# Patient Record
Sex: FEMALE | Race: WHITE | URBAN - METROPOLITAN AREA
[De-identification: names, ages, dates, MRNs, and addresses within clinical notes are randomized per-mention and may not be internally consistent; named-entity substitution may affect disease eponyms.]

---

## 2017-01-17 ENCOUNTER — TELEPHONE (OUTPATIENT)
Dept: FAMILY MEDICINE | Facility: CLINIC | Age: 22
End: 2017-01-17

## 2017-01-17 DIAGNOSIS — R51.9 CHRONIC NONINTRACTABLE HEADACHE, UNSPECIFIED HEADACHE TYPE: Primary | ICD-10-CM

## 2017-01-17 DIAGNOSIS — G89.29 CHRONIC NONINTRACTABLE HEADACHE, UNSPECIFIED HEADACHE TYPE: Primary | ICD-10-CM

## 2017-01-17 RX ORDER — AMITRIPTYLINE HYDROCHLORIDE 50 MG/1
50 TABLET ORAL AT BEDTIME
Qty: 30 TABLET | Refills: 1 | Status: SHIPPED | OUTPATIENT
Start: 2017-01-17 | End: 2017-03-20

## 2017-01-17 NOTE — TELEPHONE ENCOUNTER
Routing refill request to provider for review/approval because:  Drug interaction warning :: Fluoextine      Medina Varela RN  [facility]

## 2017-01-17 NOTE — TELEPHONE ENCOUNTER
amitriptyline (ELAVIL) 50 MG tablet      Last Written Prescription Date: 11/16/16  Last Quantity: 30, # refills: 1  Last Office Visit with [facility], [facility] or [facility] prescribing provider: 10/31/16       CREATININE   Date Value Ref Range Status   06/18/2014 0.62 0.50 - 1.00 mg/dL Final     AST       20   6/18/2014  ALT       21   6/18/2014  BP Readings from Last 3 Encounters:   10/31/16 110/70   09/27/16 111/75   05/22/15 127/80

## 2017-01-18 NOTE — TELEPHONE ENCOUNTER
Called and spoke with patient/mother, advised of NTBS.  Patient has an appt on 1/23/17.  Flora and patient would like to make this an on-going Rx and if patient needs to come in every month to ensure this, she will.    Routed to PCP for FYI and a heads-up for Monday's appt.    Medina Varela RN  [facility]

## 2017-03-20 ENCOUNTER — OFFICE VISIT (OUTPATIENT)
Dept: FAMILY MEDICINE | Facility: CLINIC | Age: 22
End: 2017-03-20
Payer: COMMERCIAL

## 2017-03-20 VITALS
HEART RATE: 100 BPM | TEMPERATURE: 97.1 F | BODY MASS INDEX: 35.51 KG/M2 | HEIGHT: 64 IN | DIASTOLIC BLOOD PRESSURE: 68 MMHG | WEIGHT: 208 LBS | SYSTOLIC BLOOD PRESSURE: 99 MMHG | OXYGEN SATURATION: 99 %

## 2017-03-20 DIAGNOSIS — R51.9 CHRONIC NONINTRACTABLE HEADACHE, UNSPECIFIED HEADACHE TYPE: ICD-10-CM

## 2017-03-20 DIAGNOSIS — G89.29 CHRONIC NONINTRACTABLE HEADACHE, UNSPECIFIED HEADACHE TYPE: ICD-10-CM

## 2017-03-20 DIAGNOSIS — Z23 NEED FOR HPV VACCINE: Primary | ICD-10-CM

## 2017-03-20 PROCEDURE — 99213 OFFICE O/P EST LOW 20 MIN: CPT | Performed by: FAMILY MEDICINE

## 2017-03-20 PROCEDURE — 90651 9VHPV VACCINE 2/3 DOSE IM: CPT | Performed by: FAMILY MEDICINE

## 2017-03-20 RX ORDER — AMITRIPTYLINE HYDROCHLORIDE 50 MG/1
50 TABLET ORAL AT BEDTIME
Qty: 90 TABLET | Refills: 1 | Status: SHIPPED | OUTPATIENT
Start: 2017-03-20

## 2017-03-20 ASSESSMENT — ANXIETY QUESTIONNAIRES
3. WORRYING TOO MUCH ABOUT DIFFERENT THINGS: MORE THAN HALF THE DAYS
5. BEING SO RESTLESS THAT IT IS HARD TO SIT STILL: MORE THAN HALF THE DAYS
6. BECOMING EASILY ANNOYED OR IRRITABLE: MORE THAN HALF THE DAYS
7. FEELING AFRAID AS IF SOMETHING AWFUL MIGHT HAPPEN: NOT AT ALL
1. FEELING NERVOUS, ANXIOUS, OR ON EDGE: MORE THAN HALF THE DAYS
2. NOT BEING ABLE TO STOP OR CONTROL WORRYING: MORE THAN HALF THE DAYS
IF YOU CHECKED OFF ANY PROBLEMS ON THIS QUESTIONNAIRE, HOW DIFFICULT HAVE THESE PROBLEMS MADE IT FOR YOU TO DO YOUR WORK, TAKE CARE OF THINGS AT HOME, OR GET ALONG WITH OTHER PEOPLE: SOMEWHAT DIFFICULT
GAD7 TOTAL SCORE: 11

## 2017-03-20 ASSESSMENT — PAIN SCALES - GENERAL: PAINLEVEL: NO PAIN (0)

## 2017-03-20 ASSESSMENT — PATIENT HEALTH QUESTIONNAIRE - PHQ9: 5. POOR APPETITE OR OVEREATING: SEVERAL DAYS

## 2017-03-20 NOTE — MR AVS SNAPSHOT
"              After Visit Summary   3/20/2017    Kiera Juarez    MRN: [id number]           Patient Information     Date Of Birth          1995        Visit Information        Provider Department      3/20/2017 9:40 AM Jesús Cedeno MD [facility]        Today's Diagnoses     Need for HPV vaccine    -  1    Chronic nonintractable headache, unspecified headache type           Follow-ups after your visit        Follow-up notes from your care team     Return in about 6 months (around 9/20/2017) for Routine Visit.      Who to contact     If you have questions or need follow up information about today's clinic visit or your schedule please contact [facility] directly at [phone].  Normal or non-critical lab and imaging results will be communicated to you by MyChart, letter or phone within 4 business days after the clinic has received the results. If you do not hear from us within 7 days, please contact the clinic through MyChart or phone. If you have a critical or abnormal lab result, we will notify you by phone as soon as possible.  Submit refill requests through [employer] or call your pharmacy and they will forward the refill request to us. Please allow 3 business days for your refill to be completed.          Additional Information About Your Visit        MyChart Information     [employer] lets you send messages to your doctor, view your test results, renew your prescriptions, schedule appointments and more. To sign up, go to [email or web address] . Click on \"Log in\" on the left side of the screen, which will take you to the Welcome page. Then click on \"Sign up Now\" on the right side of the page.     You will be asked to enter the access code listed below, as well as some personal information. Please follow the directions to create your username and password.     Your access code is: [email or web address]  Expires: 6/18/2017 10:13 AM     Your access code will " " in 90 days. If you need help or a new code, please call your [facility] clinic or [phone].        Care EveryWhere ID     This is your Care EveryWhere ID. This could be used by other organizations to access your [facility] medical records  [id number]        Your Vitals Were     Pulse Temperature Height Last Period Pulse Oximetry BMI (Body Mass Index)    100 97.1  F (36.2  C) (Oral) 5' 4\" (1.626 m) 2017 (Approximate) 99% 35.7 kg/m2       Blood Pressure from Last 3 Encounters:   17 99/68   10/31/16 110/70   16 111/75    Weight from Last 3 Encounters:   17 208 lb (94.3 kg)   10/31/16 202 lb (91.6 kg) (98 %)*   16 199 lb 12 oz (90.6 kg) (97 %)*     * Growth percentiles are based on [facility] 2-20 Years data.              We Performed the Following     C HUMAN PAPILLOMA VIRUS VACCINE (GARDASIL 9) 3 DOSE IM     DEPRESSION ACTION PLAN (DAP) Order [[id number]]          Today's Medication Changes          These changes are accurate as of: 3/20/17 10:13 AM.  If you have any questions, ask your nurse or doctor.               Stop taking these medicines if you haven't already. Please contact your care team if you have questions.     busPIRone 5 MG tablet   Commonly known as:  BUSPAR   Stopped by:  Jesús Cedeno MD                Where to get your medicines      These medications were sent to [facility] [address] [place] [address] [address]   [facility] [address]     Phone:  [phone]     amitriptyline 50 MG tablet                Primary Care Provider Office Phone # Fax #    Marci Tellez PA-C [phone]       [facility] [address]  [facility] [address]        Thank you!     Thank you for choosing [facility]  for your care. Our goal is always to provide you with excellent care. Hearing back from our patients is one way we can continue to improve our services. Please take a few " minutes to complete the written survey that you may receive in the mail after your visit with us. Thank you!             Your Updated Medication List - Protect others around you: Learn how to safely use, store and throw away your medicines at www.disposemymeds.org.          This list is accurate as of: 3/20/17 10:13 AM.  Always use your most recent med list.                   Brand Name Dispense Instructions for use    amitriptyline 50 MG tablet    ELAVIL    90 tablet    Take 1 tablet (50 mg) by mouth At Bedtime       FLUoxetine 20 MG capsule    PROzac    180 capsule    Take 2 capsules (40 mg) by mouth daily       Multi-vitamin Tabs tablet      Take 1 tablet by mouth daily Reported on 3/20/2017

## 2017-03-20 NOTE — PROGRESS NOTES
SUBJECTIVE:                                                    Kiera Juarez is a 22 year old female who presents to clinic today for the following health issues:    Medication Followup of Elavil    Taking Medication as prescribed: yes    Side Effects:  None    Medication Helping Symptoms:  yes     *needs refill on this medication, wants to continue using this medication and the dosage because it is helping her.  She takes this for sleep and anxiety.  Feels these things are under control.  Headaches are 2-3 times per week but severe ones not responsive to otc meds are less frequent.  Takes tylenol or advil for the others.  Would like longer term script for the elavil at this time.  Feels like she would like to address mood issues with Opal Tellez.  Reports being on a variety of meds since sophomore year in .  Buspar caused headaches and just did not feel prozac did much for her.  No other concerns were noted at this time.    Humaira Villa MA    PHQ-9 SCORE 3/20/2017   Total Score -   Total Score 13     DENZEL-7 SCORE 9/27/2016 10/31/2016 3/20/2017   Total Score - - -   Total Score 10 9 11               Problem list and histories reviewed & adjusted, as indicated.  Additional history: as documented    Patient Active Problem List   Diagnosis     Ventricular ectopy     Anxiety     Headache     Major depressive disorder, single episode, mild (H)     CARDIOVASCULAR SCREENING; LDL GOAL LESS THAN 160     Past Surgical History   Procedure Laterality Date     No history of surgery         Social History   Substance Use Topics     Smoking status: Never Smoker     Smokeless tobacco: Never Used     Alcohol use No     Family History   Problem Relation Age of Onset     Neurologic Disorder Father      epilepsy      Neurologic Disorder Maternal Half-Sister      migraines     Neurologic Disorder Nephew      migraines         Current Outpatient Prescriptions   Medication Sig Dispense Refill     amitriptyline (ELAVIL) 50 MG  "tablet Take 1 tablet (50 mg) by mouth At Bedtime 90 tablet 1     [DISCONTINUED] amitriptyline (ELAVIL) 50 MG tablet Take 1 tablet (50 mg) by mouth At Bedtime 30 tablet 1     FLUoxetine (PROZAC) 20 MG capsule Take 2 capsules (40 mg) by mouth daily (Patient not taking: Reported on 3/20/2017) 180 capsule 0     multivitamin, therapeutic with minerals (MULTI-VITAMIN) TABS Take 1 tablet by mouth daily Reported on 3/20/2017       No Known Allergies    Reviewed and updated as needed this visit by clinical staff  Tobacco  Allergies  Meds  Med Hx  Surg Hx  Fam Hx  Soc Hx      Reviewed and updated as needed this visit by Provider  Tobacco  Meds  Fam Hx  Soc Hx        ROS:  C: NEGATIVE for fever, chills, change in weight  E/M: NEGATIVE for ear, mouth and throat problems  R: NEGATIVE for significant cough or SOB  CV: NEGATIVE for chest pain, palpitations or peripheral edema    OBJECTIVE:                                                    BP 99/68 (BP Location: Right arm, Patient Position: Chair, Cuff Size: Adult Large)  Pulse 100  Temp 97.1  F (36.2  C) (Oral)  Ht 5' 4\" (1.626 m)  Wt 208 lb (94.3 kg)  LMP 03/06/2017 (Approximate)  SpO2 99%  BMI 35.7 kg/m2  Body mass index is 35.7 kg/(m^2).  GENERAL: healthy, alert and no distress  NEURO: Normal strength and tone, mentation intact and speech normal    Diagnostic Test Results:  none      ASSESSMENT/PLAN:                                                             1. Chronic nonintractable headache, unspecified headache type  Refilled for 6 months at this time.  Recommended follow up with primary when she is ready to discuss more options for anxiety and depressive symptoms as current scoring would not suggest remission.  - DEPRESSION ACTION PLAN (DAP) Order [[id number]]  - amitriptyline (ELAVIL) 50 MG tablet; Take 1 tablet (50 mg) by mouth At Bedtime  Dispense: 90 tablet; Refill: 1    2. Need for HPV vaccine  HPV 2 today.  Can get third in 6 months.  - C HUMAN PAPILLOMA " VIRUS VACCINE (GARDASIL 9) 3 DOSE IM    See Patient Instructions    Jesús Cedeno MD  [facility]

## 2017-03-20 NOTE — NURSING NOTE
"Chief Complaint   Patient presents with     Recheck Medication       Initial BP 99/68 (BP Location: Right arm, Patient Position: Chair, Cuff Size: Adult Large)  Pulse 100  Temp 97.1  F (36.2  C) (Oral)  Ht 5' 4\" (1.626 m)  Wt 208 lb (94.3 kg)  LMP 03/06/2017 (Approximate)  SpO2 99%  BMI 35.7 kg/m2 Estimated body mass index is 35.7 kg/(m^2) as calculated from the following:    Height as of this encounter: 5' 4\" (1.626 m).    Weight as of this encounter: 208 lb (94.3 kg).  Medication Reconciliation: complete   Humaira Villa MA      "

## 2017-03-21 ASSESSMENT — ANXIETY QUESTIONNAIRES: GAD7 TOTAL SCORE: 11

## 2017-03-21 ASSESSMENT — PATIENT HEALTH QUESTIONNAIRE - PHQ9: SUM OF ALL RESPONSES TO PHQ QUESTIONS 1-9: 13

## 2017-04-14 ENCOUNTER — TELEPHONE (OUTPATIENT)
Dept: FAMILY MEDICINE | Facility: CLINIC | Age: 22
End: 2017-04-14

## 2017-04-17 NOTE — TELEPHONE ENCOUNTER
Panel Management Review      Patient has the following on her problem list:     Depression / Dysthymia review  PHQ-9 SCORE 9/27/2016 10/31/2016 3/20/2017   Total Score - - -   Total Score 13 10 13      Patient is due for:  None      Composite cancer screening  Chart review shows that this patient is due/due soon for the following None  Summary:    Patient is due/failing the following:   None    Action needed:   None needed as patient was seen with in the index dates and phq-9 was updated.    Type of outreach:    None needed as patient was seen and updated PHQ-9 within the index dates.    Questions for provider review:    None                                                                                                                                    Lauren Collado [facility]        Chart routed to Care Team .

## 2017-11-07 ENCOUNTER — TRANSFERRED RECORDS (OUTPATIENT)
Dept: HEALTH INFORMATION MANAGEMENT | Facility: CLINIC | Age: 22
End: 2017-11-07

## 2017-11-08 ENCOUNTER — TRANSFERRED RECORDS (OUTPATIENT)
Dept: HEALTH INFORMATION MANAGEMENT | Facility: CLINIC | Age: 22
End: 2017-11-08

## 2017-11-09 ENCOUNTER — TRANSFERRED RECORDS (OUTPATIENT)
Dept: HEALTH INFORMATION MANAGEMENT | Facility: CLINIC | Age: 22
End: 2017-11-09

## 2017-11-14 ENCOUNTER — TRANSFERRED RECORDS (OUTPATIENT)
Dept: HEALTH INFORMATION MANAGEMENT | Facility: CLINIC | Age: 22
End: 2017-11-14

## 2017-11-21 ENCOUNTER — TRANSFERRED RECORDS (OUTPATIENT)
Dept: HEALTH INFORMATION MANAGEMENT | Facility: CLINIC | Age: 22
End: 2017-11-21

## 2017-12-20 ENCOUNTER — TRANSFERRED RECORDS (OUTPATIENT)
Dept: HEALTH INFORMATION MANAGEMENT | Facility: CLINIC | Age: 22
End: 2017-12-20

## 2018-02-07 ENCOUNTER — TRANSFERRED RECORDS (OUTPATIENT)
Dept: HEALTH INFORMATION MANAGEMENT | Facility: CLINIC | Age: 23
End: 2018-02-07

## 2018-03-07 ENCOUNTER — TRANSFERRED RECORDS (OUTPATIENT)
Dept: HEALTH INFORMATION MANAGEMENT | Facility: CLINIC | Age: 23
End: 2018-03-07

## 2018-03-08 ENCOUNTER — TRANSFERRED RECORDS (OUTPATIENT)
Dept: HEALTH INFORMATION MANAGEMENT | Facility: CLINIC | Age: 23
End: 2018-03-08

## 2018-03-26 ENCOUNTER — TRANSFERRED RECORDS (OUTPATIENT)
Dept: HEALTH INFORMATION MANAGEMENT | Facility: CLINIC | Age: 23
End: 2018-03-26

## 2018-04-15 ENCOUNTER — HEALTH MAINTENANCE LETTER (OUTPATIENT)
Age: 23
End: 2018-04-15

## 2021-04-30 ENCOUNTER — HOSPITAL ENCOUNTER (EMERGENCY)
Dept: EMERGENCY MEDICINE | Facility: HOSPITAL | Age: 26
Discharge: HOME | End: 2021-05-01
Attending: EMERGENCY MEDICINE
Payer: COMMERCIAL

## 2021-04-30 DIAGNOSIS — S83.005A DISLOCATION OF LEFT PATELLA, INITIAL ENCOUNTER: ICD-10-CM

## 2021-04-30 ASSESSMENT — MIFFLIN-ST. JEOR: SCORE: 1687.55

## 2021-06-05 VITALS — WEIGHT: 210 LBS | BODY MASS INDEX: 35.85 KG/M2 | HEIGHT: 64 IN

## 2021-06-17 NOTE — ED PROVIDER NOTES
EMERGENCY DEPARTMENT ENCOUNTER      NAME: Kiera Juarez  AGE: 26 y.o. female  YOB: 1995  MRN: [id number]  EVALUATION DATE & TIME: 2021 11:58 PM    PCP: Provider, No Primary Care    ED PROVIDER: Esther Stanford DO.      Chief Complaint   Patient presents with     Knee Injury         FINAL IMPRESSION:  1. Dislocation of left patella, initial encounter          ED COURSE & MEDICAL DECISION MAKIN-year-old female presented to the ED for evaluation of left knee pain.  The patient states that she injured her left knee while at work.  The patient states that she may have twisted her left knee which resulted in injury.  The patient felt that she dislocated her patella which has happened in the past.  However, she stated that the patella to be back in place when she arrived to the ED.  The patient was able to ambulate with some pain prior to arrival.  On arrival to the ED the patient was noted to be hemodynamically stable.  She did not appear to be in any obvious distress or discomfort at the time of her initial evaluation.  An x-ray was completed prior to my examination.  The x-ray did not show evidence of a fracture.  The patella appeared to be high riding which indicated a possible ligamentous injury/tear below the patella.   On exam, however, the patient's patella appeared to be in the normal position when compared to the contralateral knee.  The patient was able to fully extend her left lower leg without difficulty.  There was minimal tenderness palpation located just above and lateral to the patella.  Minimal swelling was noted within the left knee.  Based upon the exam findings it seems less likely that the patient has a ligamentous patella tear.  It is quite likely that the patient experienced a dislocated patella that reduced on its own prior to evaluation.  The patient stated that she had a knee immobilizer at home from the previous patellar dislocation.  She was given crutches with  instructions to avoid weightbearing on the left leg.  The patient was given ibuprofen here in the ED to help with pain control.  She was instructed to follow-up with orthopedics within the next week for reevaluation of the left knee or to return back to ED sooner for any worsening knee pain or any other new or concerning symptoms.    12:07 AM I met with patient for initial interview and exam. PPE used includes cap, N95, surgical mask, face shield, gloves.  12:24 AM We discussed plans for discharge including supportive cares, symptomatic treatment, outpatient follow up, and reasons to return to the emergency department.      Pertinent Labs & Imaging studies reviewed. (See chart for details)    At the conclusion of the encounter I discussed the results of all of the tests and the disposition.  Questions were answered. The patient or family acknowledged understanding and was agreeable with the care plan.         MEDICATIONS GIVEN IN THE EMERGENCY:  Medications   ibuprofen tablet 600 mg (ADVIL,MOTRIN) (600 mg Oral Given 5/1/21 0050)       NEW PRESCRIPTIONS STARTED AT TODAY'S ER VISIT  There are no discharge medications for this patient.         =================================================================    HPI    Patient information was obtained from: patient     Use of Intrepreter: N/A     Kiera Juarez is a 26 y.o. female with no pertinent history who presents to the ED by walk in for evaluation of knee injury.    Patient was at work when she was walking backwards and ran into a grocery palate. When she ran into the palate, she thinks her left knee twisted and dislocated her patella. She reports pain above the patella and the lateral side of the patella with swelling. After this event, she straightened her knee and it went back into place. Reports that she has done this before in the past. She did not take any pain medication before arrival. Denies any other complaints.    REVIEW OF SYSTEMS   Review of Systems  "  Musculoskeletal:        Positive for left knee pain (above and lateral side) and left knee swelling.      All other systems reviewed and are negative    PAST MEDICAL HISTORY:  History reviewed. No pertinent past medical history.    PAST SURGICAL HISTORY:  History reviewed. No pertinent surgical history.      CURRENT MEDICATIONS:    No current facility-administered medications on file prior to encounter.      No current outpatient medications on file prior to encounter.       ALLERGIES:  No Known Allergies    FAMILY HISTORY:  History reviewed. No pertinent family history.    SOCIAL HISTORY:   Social History     Socioeconomic History     Marital status: Single     Spouse name: None     Number of children: None     Years of education: None     Highest education level: None   Occupational History     None   Social Needs     Financial resource strain: None     Food insecurity     Worry: None     Inability: None     Transportation needs     Medical: None     Non-medical: None   Tobacco Use     Smoking status: Never Smoker     Smokeless tobacco: Never Used   Substance and Sexual Activity     Alcohol use: None     Drug use: None     Sexual activity: None   Lifestyle     Physical activity     Days per week: None     Minutes per session: None     Stress: None   Relationships     Social connections     Talks on phone: None     Gets together: None     Attends [employer] service: None     Active member of club or organization: None     Attends meetings of clubs or organizations: None     Relationship status: None     Intimate partner violence     Fear of current or ex partner: None     Emotionally abused: None     Physically abused: None     Forced sexual activity: None   Other Topics Concern     None   Social History Narrative     None       VITALS:  Patient Vitals for the past 24 hrs:   BP Temp Temp src Pulse Resp SpO2 Height Weight   04/30/21 2245 127/72 98  F (36.7  C) Temporal 69 16 100 % 5' 4\" (1.626 m) 210 lb (95.3 kg) " "      PHYSICAL EXAM    VITAL SIGNS: /72 (Patient Position: Sitting)   Pulse 69   Temp 98  F (36.7  C) (Temporal)   Resp 16   Ht 5' 4\" (1.626 m)   Wt 210 lb (95.3 kg)   SpO2 100%   BMI 36.05 kg/m     General presentation: Alert, Vital signs reviewed. No apparent distress.   HENT: ENT inspection is normal. Oropharynx is moist and clear.   Eye: Pupils are equal and reactive to light. EOMI  Neck: The neck is supple.  Pulmonary: Currently in no acute respiratory distress.   Circulatory: Peripheral pulses are strong and equal in the bilateral upper lower extremities.   Neurologic: Alert, oriented to person, place, and time. No gross motor or sensory deficits noted in the upper or lower extremities. Cranial nerves II through XII are grossly intact.  Musculoskeletal: Full range of motion in all extremities. No extremity edema. Patella appears to be in place. Minimal tenderness to palpation noted above and lateral to patella. Full ROM to left knee. Can fully extend left knee. Minimal swelling noted in left knee.  Skin: Skin color is normal. No rash. Warm. Dry to touch.          RADIOLOGY:  Reviewed all pertinent imaging. Please see official radiology report.  Xr Knee Left 1 Or 2 Vws    Result Date: 4/30/2021  EXAM: XR KNEE LEFT 1 OR 2 VWS LOCATION: [facility] DATE/TIME: 4/30/2021 11:07 PM INDICATION: Dislocated patella post pre-ER visit reduction COMPARISON: None.     The patella is high riding and there is suggestion of patellar ligament injury along the inferior pole of the patella. No fracture. Normal alignment otherwise.      I, Molly Lester, am serving as a scribe to document services personally performed by Dr. Esther Stanford based on my observation and the provider's statements to me. I, Esther Stanford, DO attest that Molly Lester is acting in a scribe capacity, has observed my performance of the services and has documented them in accordance with my direction.    Esther Stanford, " WHITNEY  Emergency Medicine  [facility] EMERGENCY DEPARTMENT  [address]  [facility] [address]  Dept: [phone]  Loc: [phone]           Esther Stanford DO  05/01/21 0112

## 2021-06-17 NOTE — ED TRIAGE NOTES
She dislocated her left patella falling about an hour ago at work. It appears the patella is back in proper alignment at this time. She has walked a short distance since the injury. She had this happen one other time a few years ago. She is here with her mother.

## 2021-07-30 ENCOUNTER — TRANSFERRED RECORDS (OUTPATIENT)
Dept: HEALTH INFORMATION MANAGEMENT | Facility: CLINIC | Age: 26
End: 2021-07-30

## 2022-11-14 ENCOUNTER — TRANSFERRED RECORDS (OUTPATIENT)
Dept: HEALTH INFORMATION MANAGEMENT | Facility: CLINIC | Age: 27
End: 2022-11-14